# Patient Record
Sex: MALE | Race: BLACK OR AFRICAN AMERICAN | NOT HISPANIC OR LATINO | Employment: FULL TIME | URBAN - METROPOLITAN AREA
[De-identification: names, ages, dates, MRNs, and addresses within clinical notes are randomized per-mention and may not be internally consistent; named-entity substitution may affect disease eponyms.]

---

## 2024-03-12 ENCOUNTER — HOSPITAL ENCOUNTER (EMERGENCY)
Facility: HOSPITAL | Age: 29
Discharge: HOME | End: 2024-03-12

## 2024-03-12 ENCOUNTER — APPOINTMENT (OUTPATIENT)
Dept: RADIOLOGY | Facility: HOSPITAL | Age: 29
End: 2024-03-12

## 2024-03-12 VITALS
OXYGEN SATURATION: 99 % | TEMPERATURE: 100 F | HEART RATE: 92 BPM | RESPIRATION RATE: 15 BRPM | SYSTOLIC BLOOD PRESSURE: 129 MMHG | WEIGHT: 267 LBS | BODY MASS INDEX: 36.21 KG/M2 | DIASTOLIC BLOOD PRESSURE: 85 MMHG

## 2024-03-12 DIAGNOSIS — L03.119 CELLULITIS OF LOWER EXTREMITY, UNSPECIFIED LATERALITY: Primary | ICD-10-CM

## 2024-03-12 PROCEDURE — 99284 EMERGENCY DEPT VISIT MOD MDM: CPT | Mod: 25

## 2024-03-12 PROCEDURE — 99284 EMERGENCY DEPT VISIT MOD MDM: CPT | Performed by: PHYSICIAN ASSISTANT

## 2024-03-12 PROCEDURE — 2500000001 HC RX 250 WO HCPCS SELF ADMINISTERED DRUGS (ALT 637 FOR MEDICARE OP): Performed by: PHYSICIAN ASSISTANT

## 2024-03-12 PROCEDURE — 93971 EXTREMITY STUDY: CPT | Performed by: RADIOLOGY

## 2024-03-12 PROCEDURE — 93971 EXTREMITY STUDY: CPT

## 2024-03-12 PROCEDURE — 2500000002 HC RX 250 W HCPCS SELF ADMINISTERED DRUGS (ALT 637 FOR MEDICARE OP, ALT 636 FOR OP/ED): Performed by: PHYSICIAN ASSISTANT

## 2024-03-12 RX ORDER — CEPHALEXIN 250 MG/1
500 CAPSULE ORAL ONCE
Status: COMPLETED | OUTPATIENT
Start: 2024-03-12 | End: 2024-03-12

## 2024-03-12 RX ORDER — ACETAMINOPHEN 325 MG/1
650 TABLET ORAL EVERY 6 HOURS PRN
Qty: 30 TABLET | Refills: 0 | Status: SHIPPED | OUTPATIENT
Start: 2024-03-12

## 2024-03-12 RX ORDER — SULFAMETHOXAZOLE AND TRIMETHOPRIM 800; 160 MG/1; MG/1
1 TABLET ORAL ONCE
Status: COMPLETED | OUTPATIENT
Start: 2024-03-12 | End: 2024-03-12

## 2024-03-12 RX ORDER — CEPHALEXIN 500 MG/1
500 CAPSULE ORAL 4 TIMES DAILY
Qty: 40 CAPSULE | Refills: 0 | Status: SHIPPED | OUTPATIENT
Start: 2024-03-12 | End: 2024-03-22

## 2024-03-12 RX ORDER — OXYCODONE AND ACETAMINOPHEN 5; 325 MG/1; MG/1
1 TABLET ORAL ONCE
Status: COMPLETED | OUTPATIENT
Start: 2024-03-12 | End: 2024-03-12

## 2024-03-12 RX ORDER — SULFAMETHOXAZOLE AND TRIMETHOPRIM 400; 80 MG/1; MG/1
1 TABLET ORAL 2 TIMES DAILY
Qty: 20 TABLET | Refills: 0 | Status: SHIPPED | OUTPATIENT
Start: 2024-03-12 | End: 2024-03-22

## 2024-03-12 RX ORDER — IBUPROFEN 600 MG/1
600 TABLET ORAL EVERY 6 HOURS PRN
Qty: 30 TABLET | Refills: 0 | Status: SHIPPED | OUTPATIENT
Start: 2024-03-12

## 2024-03-12 RX ADMIN — SULFAMETHOXAZOLE AND TRIMETHOPRIM 1 TABLET: 800; 160 TABLET ORAL at 14:25

## 2024-03-12 RX ADMIN — CEPHALEXIN 500 MG: 250 CAPSULE ORAL at 14:24

## 2024-03-12 RX ADMIN — OXYCODONE HYDROCHLORIDE AND ACETAMINOPHEN 1 TABLET: 5; 325 TABLET ORAL at 14:25

## 2024-03-12 ASSESSMENT — PAIN DESCRIPTION - LOCATION: LOCATION: LEG

## 2024-03-12 ASSESSMENT — PAIN SCALES - GENERAL: PAINLEVEL_OUTOF10: 10 - WORST POSSIBLE PAIN

## 2024-03-12 ASSESSMENT — PAIN - FUNCTIONAL ASSESSMENT: PAIN_FUNCTIONAL_ASSESSMENT: 0-10

## 2024-03-12 ASSESSMENT — PAIN DESCRIPTION - ORIENTATION: ORIENTATION: RIGHT

## 2024-03-12 NOTE — Clinical Note
Jazz Plasencia was seen and treated in our emergency department on 3/12/2024.  He may return to work on 03/14/2024.       If you have any questions or concerns, please don't hesitate to call.      Gerardo Faye PA-C

## 2024-03-12 NOTE — ED PROVIDER NOTES
HPI:  29-year-old male otherwise healthy presenting for right leg pain and swelling.  States he first noticed it on Sunday and has increased since then.  Denies any known inciting incident including fall or any other injury.  States he does have some scratches on his legs which he does not how that happened.  Denies any fevers chills night sweats or rigors.  Does have some pain with ambulating on it.      Physical Exam:   GEN: Vitals noted. NAD  EYES:  EOMs grossly intact, anicteric sclera  ANANYA: Mucosa moist.  NECK: Supple.  CARD: RRR  PULMONARY: Moving air well. Clear all lung fields.  ABDOMEN: Soft, no guarding, no rigidity. Nontender. NABS  EXTREMITIES: Mild diffuse swelling with warmth and erythema extending from the ankle up to the knee with no specific joint involvement, no knee or ankle effusion palpable, 2+ distal pulses with brisk capillary refill with intact sensation, ambulates with obvious discomfort,  SKIN: Intact, warm and dry  NEURO: Alert and oriented x 3, speech is clear, no obvious deficits noted.       ----------------------------------------------------------------------------------------------------------------------------    MDM:  29-year-old male presenting for right leg pain and swelling x 3 days.  On exam he is well-appearing sitting up comfortably.  Mildly tachycardic however afebrile.  Does have diffuse mild tenderness swelling with erythema to the right lower leg.  Differential diagnosis is highly concern for cellulitis, however concerned also for DVT versus venous stasis.  Will perform duplex ultrasound.  Will provide antibiotics given high suspicion for cellulitis.  With lack of systemic symptoms and no indication at this time for parenteral antibiotics or laboratory work or admission although was considered.  Will provide analgesics here as he is getting a ride home.  Ultrasound without signs of DVT.  He is discharged home with prescription for Bactrim and Keflex.  Tylenol ibuprofen  provided.  To follow-up with PCP within 1 week to ensure improvement in his symptoms.  Return precautions reviewed.    Diagnoses as of 03/12/24 1644   Cellulitis of lower extremity, unspecified laterality     Lower extremity venous duplex right   Preliminary Result   1.  No sonographic evidence for deep vein thrombosis within the   evaluated veins of the right lower extremity.   2. Nonspecific incidental enlarged right inguinal lymph nodes.             I personally reviewed the images/study and I agree with the findings   as stated by Resident Guy Fay MD. This study was interpreted   at [place], [place].        MACRO:   None             Dictation workstation:   [id number]        Labs Reviewed - No data to display    ----------------------------------------------------------------------------------------------------------------------------    This note was dictated using a speech recognition program.  While an attempt was made at proof reading to minimize errors, minor errors in transcription may be present call for questions.     Gerardo Faye PA-C  03/12/24 5008

## 2024-03-12 NOTE — DISCHARGE INSTRUCTIONS
Your ultrasound shows no signs of blood clot.  Take the full course of the antibiotics to treat the infection of your leg.  Follow-up within 1 week at your PCP, referral is made, if you have not heard from them call the number listed below.  Take the Tylenol and ibuprofen for pain relief.

## 2024-09-11 ENCOUNTER — HOSPITAL ENCOUNTER (EMERGENCY)
Facility: HOSPITAL | Age: 29
Discharge: HOME | End: 2024-09-11
Attending: EMERGENCY MEDICINE

## 2024-09-11 VITALS
HEIGHT: 72 IN | OXYGEN SATURATION: 97 % | TEMPERATURE: 96.8 F | HEART RATE: 86 BPM | WEIGHT: 275 LBS | SYSTOLIC BLOOD PRESSURE: 124 MMHG | DIASTOLIC BLOOD PRESSURE: 75 MMHG | BODY MASS INDEX: 37.25 KG/M2 | RESPIRATION RATE: 18 BRPM

## 2024-09-11 DIAGNOSIS — R19.7 DIARRHEA, UNSPECIFIED TYPE: Primary | ICD-10-CM

## 2024-09-11 PROCEDURE — 99282 EMERGENCY DEPT VISIT SF MDM: CPT

## 2024-09-11 PROCEDURE — 99283 EMERGENCY DEPT VISIT LOW MDM: CPT | Performed by: EMERGENCY MEDICINE

## 2024-09-11 RX ORDER — LOPERAMIDE HYDROCHLORIDE 2 MG/1
2 CAPSULE ORAL 4 TIMES DAILY PRN
Qty: 28 CAPSULE | Refills: 0 | Status: SHIPPED | OUTPATIENT
Start: 2024-09-11 | End: 2024-09-18

## 2024-09-11 ASSESSMENT — COLUMBIA-SUICIDE SEVERITY RATING SCALE - C-SSRS
6. HAVE YOU EVER DONE ANYTHING, STARTED TO DO ANYTHING, OR PREPARED TO DO ANYTHING TO END YOUR LIFE?: NO
2. HAVE YOU ACTUALLY HAD ANY THOUGHTS OF KILLING YOURSELF?: NO
1. IN THE PAST MONTH, HAVE YOU WISHED YOU WERE DEAD OR WISHED YOU COULD GO TO SLEEP AND NOT WAKE UP?: NO

## 2024-09-11 NOTE — ED PROVIDER NOTES
HPI   Chief Complaint   Patient presents with    Diarrhea       HPI  Patient is a 29-year-old with no past medical history presenting with diarrhea.  Patient said for the past 3 days he has been using the bathroom 4-5 times a day.  He decided to take an over-the-counter medication and that has not helped.  Patient denies any fever, nausea and vomiting.  Patient stool does not have blood or pus in it.  Patient has not recently traveled outside of the [place].  Patient denies any abdominal tenderness.      Patient History   Past Medical History:   Diagnosis Date    Personal history of diseases of the skin and subcutaneous tissue 03/09/2021    History of eczema     No past surgical history on file.  No family history on file.  Social History     Tobacco Use    Smoking status: Not on file    Smokeless tobacco: Not on file   Substance Use Topics    Alcohol use: Not on file    Drug use: Not on file       Physical Exam   ED Triage Vitals [09/11/24 0422]   Temperature Heart Rate Respirations BP   36 °C (96.8 °F) 86 18 124/75      Pulse Ox Temp Source Heart Rate Source Patient Position   97 % Temporal Monitor Sitting      BP Location FiO2 (%)     Left arm --       Physical Exam  Constitutional:       Appearance: Normal appearance.   Cardiovascular:      Rate and Rhythm: Normal rate and regular rhythm.      Pulses: Normal pulses.      Heart sounds: Normal heart sounds.   Pulmonary:      Effort: Pulmonary effort is normal.      Breath sounds: Normal breath sounds.   Abdominal:      General: Abdomen is flat. Bowel sounds are normal.      Palpations: Abdomen is soft.   Musculoskeletal:         General: Normal range of motion.   Skin:     General: Skin is dry.      Capillary Refill: Capillary refill takes 2 to 3 seconds.   Neurological:      General: No focal deficit present.      Mental Status: He is alert and oriented to person, place, and time. Mental status is at baseline.           ED Course & MDM   Diagnoses as of 09/11/24 0639    Diarrhea, unspecified type         Medical Decision Making  Patient is a 29-year-old with no past medical history presenting with diarrhea.  Patient said for the past 3 days he has been using the bathroom 4-5 times a day.  He decided to take an over-the-counter medication and that has not helped.  Patient denies any fever, nausea and vomiting.  Patient stool does not have blood or pus in it.  Patient has not recently traveled outside of the [place].  Patient denies any abdominal tenderness.  At bedside patient was hemodynamically stable and reserved.  Patient physical exam was benign.  Patient looks well and was mentating well.  Patient was prescribed Imodium and given return precautions.  Patient was told to follow-up with GI if symptoms persist for more than 2 weeks.  Patient was then discharged.    Procedure  Procedures     Cornelius Menard MD  Resident  09/11/24 8297

## 2024-09-11 NOTE — ED TRIAGE NOTES
SUMMER BYRNE is a 29y old who is otherwise healthy presenting to [facility] ED with a chief concern for non bloody diarrhea x4 days. Pt denies any active abdominal pain, nausea or vomiting. NKDA

## 2025-04-22 ENCOUNTER — PHARMACY VISIT (OUTPATIENT)
Dept: PHARMACY | Facility: CLINIC | Age: 30
End: 2025-04-22
Payer: COMMERCIAL

## 2025-04-22 ENCOUNTER — HOSPITAL ENCOUNTER (EMERGENCY)
Facility: HOSPITAL | Age: 30
Discharge: HOME | End: 2025-04-22
Attending: EMERGENCY MEDICINE

## 2025-04-22 ENCOUNTER — APPOINTMENT (OUTPATIENT)
Dept: RADIOLOGY | Facility: HOSPITAL | Age: 30
End: 2025-04-22

## 2025-04-22 VITALS
SYSTOLIC BLOOD PRESSURE: 137 MMHG | BODY MASS INDEX: 31.15 KG/M2 | TEMPERATURE: 97.3 F | OXYGEN SATURATION: 97 % | DIASTOLIC BLOOD PRESSURE: 90 MMHG | HEIGHT: 72 IN | HEART RATE: 83 BPM | WEIGHT: 230 LBS | RESPIRATION RATE: 17 BRPM

## 2025-04-22 DIAGNOSIS — Z00.00 HEALTHCARE MAINTENANCE: ICD-10-CM

## 2025-04-22 DIAGNOSIS — L03.115 CELLULITIS OF RIGHT LOWER EXTREMITY: ICD-10-CM

## 2025-04-22 DIAGNOSIS — M79.89 LEG SWELLING: Primary | ICD-10-CM

## 2025-04-22 PROCEDURE — 93971 EXTREMITY STUDY: CPT | Performed by: RADIOLOGY

## 2025-04-22 PROCEDURE — 2500000001 HC RX 250 WO HCPCS SELF ADMINISTERED DRUGS (ALT 637 FOR MEDICARE OP)

## 2025-04-22 PROCEDURE — RXMED WILLOW AMBULATORY MEDICATION CHARGE

## 2025-04-22 PROCEDURE — 99284 EMERGENCY DEPT VISIT MOD MDM: CPT | Mod: 25 | Performed by: EMERGENCY MEDICINE

## 2025-04-22 PROCEDURE — 93971 EXTREMITY STUDY: CPT

## 2025-04-22 RX ORDER — IBUPROFEN 600 MG/1
600 TABLET ORAL EVERY 6 HOURS PRN
Qty: 16 TABLET | Refills: 0 | Status: SHIPPED | OUTPATIENT
Start: 2025-04-22 | End: 2025-04-26

## 2025-04-22 RX ORDER — DOXYCYCLINE HYCLATE 100 MG
100 TABLET ORAL ONCE
Status: COMPLETED | OUTPATIENT
Start: 2025-04-22 | End: 2025-04-22

## 2025-04-22 RX ORDER — CEFUROXIME AXETIL 500 MG/1
500 TABLET ORAL 2 TIMES DAILY
Qty: 20 TABLET | Refills: 0 | Status: SHIPPED | OUTPATIENT
Start: 2025-04-22 | End: 2025-05-02

## 2025-04-22 RX ORDER — ACETAMINOPHEN 325 MG/1
650 TABLET ORAL EVERY 6 HOURS PRN
Qty: 20 TABLET | Refills: 0 | Status: SHIPPED | OUTPATIENT
Start: 2025-04-22 | End: 2025-04-27

## 2025-04-22 RX ORDER — DOXYCYCLINE 100 MG/1
100 TABLET ORAL 2 TIMES DAILY
Qty: 14 TABLET | Refills: 0 | Status: SHIPPED | OUTPATIENT
Start: 2025-04-22 | End: 2025-04-22

## 2025-04-22 RX ORDER — DOXYCYCLINE 100 MG/1
100 TABLET ORAL 2 TIMES DAILY
Qty: 14 TABLET | Refills: 0 | Status: SHIPPED | OUTPATIENT
Start: 2025-04-22 | End: 2025-04-29

## 2025-04-22 RX ORDER — AMOXICILLIN AND CLAVULANATE POTASSIUM 875; 125 MG/1; MG/1
1 TABLET, FILM COATED ORAL EVERY 12 HOURS
Qty: 14 TABLET | Refills: 0 | Status: SHIPPED | OUTPATIENT
Start: 2025-04-22 | End: 2025-04-22 | Stop reason: WASHOUT

## 2025-04-22 RX ORDER — AMOXICILLIN AND CLAVULANATE POTASSIUM 875; 125 MG/1; MG/1
1 TABLET, FILM COATED ORAL ONCE
Status: COMPLETED | OUTPATIENT
Start: 2025-04-22 | End: 2025-04-22

## 2025-04-22 RX ADMIN — DOXYCYCLINE HYCLATE 100 MG: 100 TABLET, COATED ORAL at 08:21

## 2025-04-22 RX ADMIN — AMOXICILLIN AND CLAVULANATE POTASSIUM 1 TABLET: 875; 125 TABLET, FILM COATED ORAL at 08:21

## 2025-04-22 ASSESSMENT — COLUMBIA-SUICIDE SEVERITY RATING SCALE - C-SSRS
1. IN THE PAST MONTH, HAVE YOU WISHED YOU WERE DEAD OR WISHED YOU COULD GO TO SLEEP AND NOT WAKE UP?: NO
2. HAVE YOU ACTUALLY HAD ANY THOUGHTS OF KILLING YOURSELF?: NO
6. HAVE YOU EVER DONE ANYTHING, STARTED TO DO ANYTHING, OR PREPARED TO DO ANYTHING TO END YOUR LIFE?: NO

## 2025-04-22 ASSESSMENT — PAIN DESCRIPTION - LOCATION: LOCATION: LEG

## 2025-04-22 ASSESSMENT — PAIN SCALES - GENERAL: PAINLEVEL_OUTOF10: 4

## 2025-04-22 ASSESSMENT — PAIN DESCRIPTION - ORIENTATION: ORIENTATION: RIGHT

## 2025-04-22 ASSESSMENT — PAIN - FUNCTIONAL ASSESSMENT: PAIN_FUNCTIONAL_ASSESSMENT: 0-10

## 2025-04-22 ASSESSMENT — PAIN DESCRIPTION - PAIN TYPE: TYPE: ACUTE PAIN

## 2025-04-22 NOTE — DISCHARGE INSTRUCTIONS
Take antibiotics as prescribed, if you have any worsening leg pain, swelling, redness streaking up your leg, fevers or chills in conjunction with this, chest pain or shortness of breath, return to the closest emergency department.

## 2025-04-22 NOTE — PROGRESS NOTES
Emergency Medicine Transition of Care Note.    I received Jazz Plasencia in signout from previous team.  Please see the previous ED provider note for all HPI, PE and MDM up to the time of signout at 0700. This is in addition to the primary record.    In brief Jazz Plasencia is an 30 y.o. male presenting for leg swelling at the time of signout we were awaiting: Ultrasound DVT rule out    Diagnoses as of 04/22/25 0810   Leg swelling   Cellulitis of right lower extremity   Healthcare maintenance       Labs Reviewed - No data to display    Lower extremity venous duplex right               Medical Decision Making  30-year-old male initially presented for chief complaint of leg swelling and discomfort.  Was recently on antibiotics for cellulitis.  Was taking Bactrim and Keflex.  At shift change we are awaiting results of DVT ultrasound.    DVT ultrasound appears without evidence of DVT.  Also signs of reactive lymph nodes.  We will treat again for cellulitis with this time switch to doxycycline from Bactrim and switch to Ceftin from Keflex.  Given prescriptions and sent to [facility].  Advised to take as directed and we did discuss dangers of doxycycline with sunlight and pill esophagitis.  Advised to use as directed and to follow-up with primary care.  Phone number given and referral made.  Advised to return to the ED with any new or worsening symptoms.  In agreement with this plan and is ready to leave.  Discharged in stable condition.        Final diagnoses:   [M79.89] Leg swelling   [L03.115] Cellulitis of right lower extremity   [Z00.00] Healthcare maintenance           Procedure  Procedures    Jayden Arshad, APRN-CNP

## 2025-04-22 NOTE — ED PROVIDER NOTES
EMERGENCY DEPARTMENT ENCOUNTER      Pt Name: Jazz Plasencia  MRN: [id number]  Birthdate 1995  Date of evaluation: 4/22/2025  Provider: Ammon Mathews DO    CHIEF COMPLAINT       Chief Complaint   Patient presents with    Leg Swelling         HISTORY OF PRESENT ILLNESS    30-year-old male comes emergency with right leg swelling, noticed yesterday, says he noticed pain on ambulation.  He said this happened in the past but he is unsure exactly what was wrong.  No recent travel, no recent surgeries, no history of blood clots, no oral hormone use, no fevers or chills, no chest pain or shortness of breath, no other symptoms.  Denies injuries or trauma      History provided by:  Patient      Nursing Notes were reviewed.    PAST MEDICAL HISTORY   Medical History[1]      SURGICAL HISTORY     Surgical History[2]      CURRENT MEDICATIONS       Previous Medications    ACETAMINOPHEN (TYLENOL) 325 MG TABLET    Take 2 tablets (650 mg) by mouth every 6 hours if needed for mild pain (1 - 3) or fever (temp greater than 38.0 C).    IBUPROFEN 600 MG TABLET    Take 1 tablet (600 mg) by mouth every 6 hours if needed for mild pain (1 - 3) or fever (temp greater than 38.0 C).       ALLERGIES     Patient has no known allergies.    FAMILY HISTORY     Family History[3]       SOCIAL HISTORY     Social History[4]    SCREENINGS                        PHYSICAL EXAM    (up to 7 for level 4, 8 or more for level 5)     ED Triage Vitals [04/22/25 0425]   Temperature Heart Rate Respirations BP   36.3 °C (97.3 °F) 83 17 137/90      Pulse Ox Temp Source Heart Rate Source Patient Position   97 % Temporal Monitor --      BP Location FiO2 (%)     -- --       Physical Exam  Vitals and nursing note reviewed.   Constitutional:       Appearance: Normal appearance.   HENT:      Head: Normocephalic and atraumatic.   Eyes:      General: No scleral icterus.        Right eye: No discharge.         Left eye: No discharge.      Conjunctiva/sclera: Conjunctivae  normal.   Cardiovascular:      Rate and Rhythm: Normal rate and regular rhythm.   Pulmonary:      Effort: Pulmonary effort is normal. No respiratory distress.   Abdominal:      General: There is no distension.   Musculoskeletal:         General: Tenderness present.      Cervical back: Normal range of motion.      Right lower leg: Edema present.      Comments: There is right calf tenderness, there is right leg circumferential edema greater than left leg.  There is erythema on the lower aspect of the patient's right leg, warmth to palpation.  No crepitus to palpation.  There are areas of skin breakdown on the right lower extremity    Patient has a 2+ dorsalis pedis pulse of the right lower extremity, right lower extremities warm and well-perfused, no pain on passive extension of the right foot.  Negative Homans' sign.  Compartments are soft in the right leg.   Skin:     General: Skin is warm and dry.   Neurological:      Mental Status: He is alert. Mental status is at baseline.   Psychiatric:         Mood and Affect: Mood normal.         Behavior: Behavior normal.          DIAGNOSTIC RESULTS     LABS:  Labs Reviewed - No data to display    All other labs were within normal range or not returned as of this dictation.    Imaging  Lower extremity venous duplex right    (Results Pending)        Procedures  Procedures     EMERGENCY DEPARTMENT COURSE/MDM:     Diagnoses as of 04/22/25 0655   Leg swelling   Cellulitis of right lower extremity   Healthcare maintenance        Medical Decision Making  30-year-old male comes emergency with leg swelling, on exam he is palpable dorsalis pedis and posterior tibial pulses, foot is warm and well-perfused, compartments of the leg are soft, no pain on passive extension, no concern for compartment syndrome, acute arterial occlusion.  He does have cellulitis which we will treat upon discharge with Augmentin and Doxy.  Ordered a duplex ultrasound as well to assess for possible underlying  DVT.  Patient has no systemic signs, outpatient therapy is appropriate for cellulitis.  Patient signed out to morning provider pending duplex ultrasound and disposition.        Patient and or family in agreement and understanding of treatment plan.  All questions answered.      I reviewed the case with the attending ED physician. The attending ED physician agrees with the plan. Patient and/or patient´s representative was counseled regarding labs, imaging, likely diagnosis, and plan. All questions were answered.    ED Medications administered this visit:  Medications - No data to display    New Prescriptions from this visit:    New Prescriptions    AMOXICILLIN-CLAVULANATE (AUGMENTIN) 875-125 MG TABLET    Take 1 tablet by mouth every 12 hours for 7 days.    DOXYCYCLINE (ADOXA) 100 MG TABLET    Take 1 tablet (100 mg) by mouth 2 times a day for 7 days. Take with a full glass of water and do not lie down for at least 30 minutes after     Final Impression:   1. Leg swelling    2. Cellulitis of right lower extremity    3. Healthcare maintenance          (Please note that portions of this note were completed with a voice recognition program.  Efforts were made to edit the dictations but occasionally words are mis-transcribed.)         [1]   Past Medical History:  Diagnosis Date    Personal history of diseases of the skin and subcutaneous tissue 03/09/2021    History of eczema   [2] No past surgical history on file.  [3] No family history on file.  [4]   Social History  Socioeconomic History    Marital status: Single        Ammon Mathews DO  Resident  04/22/25 [id number]

## 2025-04-22 NOTE — Clinical Note
Jazz Plasencia was seen and treated in our emergency department on 4/22/2025.  He may return to work on 04/22/2025.       If you have any questions or concerns, please don't hesitate to call.      Jayden Arshad, APRN-CNP